# Patient Record
Sex: MALE | Race: WHITE | NOT HISPANIC OR LATINO | Employment: OTHER | URBAN - METROPOLITAN AREA
[De-identification: names, ages, dates, MRNs, and addresses within clinical notes are randomized per-mention and may not be internally consistent; named-entity substitution may affect disease eponyms.]

---

## 2022-02-10 DIAGNOSIS — Z00.6 EXAMINATION FOR NORMAL COMPARISON FOR CLINICAL RESEARCH: Primary | ICD-10-CM

## 2022-02-28 ENCOUNTER — OFFICE VISIT (OUTPATIENT)
Dept: CARDIAC SURGERY | Facility: CLINIC | Age: 67
End: 2022-02-28

## 2022-02-28 VITALS
WEIGHT: 84 LBS | HEIGHT: 64 IN | OXYGEN SATURATION: 96 % | SYSTOLIC BLOOD PRESSURE: 118 MMHG | BODY MASS INDEX: 14.34 KG/M2 | TEMPERATURE: 97.8 F | DIASTOLIC BLOOD PRESSURE: 60 MMHG | HEART RATE: 100 BPM

## 2022-02-28 DIAGNOSIS — R91.8 LUNG NODULES: ICD-10-CM

## 2022-02-28 DIAGNOSIS — I71.40 AAA (ABDOMINAL AORTIC ANEURYSM) WITHOUT RUPTURE: Primary | ICD-10-CM

## 2022-02-28 PROCEDURE — 99204 OFFICE O/P NEW MOD 45 MIN: CPT | Performed by: NURSE PRACTITIONER

## 2022-02-28 RX ORDER — ATORVASTATIN CALCIUM 40 MG/1
TABLET, FILM COATED ORAL
COMMUNITY
Start: 2022-02-07

## 2022-02-28 RX ORDER — ASPIRIN 81 MG/1
81 TABLET ORAL DAILY
COMMUNITY

## 2022-02-28 RX ORDER — NIFEDIPINE 30 MG/1
TABLET, EXTENDED RELEASE ORAL
COMMUNITY
Start: 2022-02-07

## 2022-02-28 RX ORDER — ALBUTEROL SULFATE 90 UG/1
AEROSOL, METERED RESPIRATORY (INHALATION)
COMMUNITY
Start: 2022-02-07

## 2022-02-28 NOTE — PROGRESS NOTES
[facility] Cardiothoracic Surgery New Patient Office Note     Date of Encounter: 2022     Name: Monty Herzog  : 1955     Referred By: No ref. provider found  PCP: Ye Celestin MD    Chief Complaint:    Chief Complaint   Patient presents with   • Consult     NP per Dr. Celestin for AAA.        Subjective      History of Present Illness:    Monty Herzog is a 66 y.o. male current smoker, with a history of HTN, HLD on statin therapy, COPD, mycobacterium kansaii treated at  in , and anemia. Patient presents today with his Son-in-law as new patient to establish with Dr. Mcfaralnd and referred by his PCP Dr. Celestin for evaluation of recently diagnosed AAA. Patient was undergoing workup for nonspecific abdominal pain with US imaging when an incidental finding of AAA was noted.  CT scan was obtained for further evaluation.  Patient reports nonspecific intermittent LLQ abdominal pain that he has had for the last 5 to 6 months that is being followed by his PCP and now with recent referral to GI for further evaluation.  He denies any sharp abdominal pain, back pain, flank pain.  Patient has baseline chronic FRANCIS due to long smoking history and COPD.  He does report family history of aneurysmal disease including his father and a cousin.  He reports his blood pressure is typically well controlled at home running 120s/70s.  He does unfortunately smoke daily.  Patient's family reports history of mycobacterium kansasii which was treated successfully at  around . Patient now follows with a pulmonologist at [facility] in [place], they cannot remember the name of provider today.     Review of Systems:  Review of Systems   Constitutional: Negative for chills, decreased appetite, diaphoresis, fever, malaise/fatigue, night sweats, weight gain and weight loss.   HENT: Negative for hoarse voice.    Eyes: Negative for blurred vision, double vision and visual disturbance.   Cardiovascular: Negative for  chest pain, claudication, dyspnea on exertion, irregular heartbeat, leg swelling, near-syncope, orthopnea, palpitations, paroxysmal nocturnal dyspnea and syncope.   Respiratory: Negative for cough, hemoptysis, shortness of breath, sputum production and wheezing.    Hematologic/Lymphatic: Negative for adenopathy and bleeding problem. Does not bruise/bleed easily.   Skin: Negative for color change, nail changes, poor wound healing and rash.   Musculoskeletal: Negative for back pain, falls and muscle cramps.   Gastrointestinal: Negative for abdominal pain, dysphagia and heartburn.   Genitourinary: Negative for flank pain.   Neurological: Negative for brief paralysis, disturbances in coordination, dizziness, focal weakness, headaches, light-headedness, loss of balance, numbness, paresthesias, sensory change, vertigo and weakness.   Psychiatric/Behavioral: Negative for depression and suicidal ideas.   Allergic/Immunologic: Negative for persistent infections.       I have reviewed the following portions of the patient's history: allergies, current medications, past family history, past medical history, past social history, past surgical history, problem list and ROS and confirm it's accurate.    Allergies:  No Known Allergies    Medications:      Current Outpatient Medications:   •  albuterol sulfate  (90 Base) MCG/ACT inhaler, , Disp: , Rfl:   •  aspirin 81 MG EC tablet, Take 81 mg by mouth Daily., Disp: , Rfl:   •  atorvastatin (LIPITOR) 40 MG tablet, , Disp: , Rfl:   •  NIFEdipine XL (PROCARDIA XL) 30 MG 24 hr tablet, , Disp: , Rfl:   •  Trelegy Ellipta 100-62.5-25 MCG/INH inhaler, , Disp: , Rfl:   •  vitamin D3 125 MCG (5000 UT) capsule capsule, , Disp: , Rfl:     History:   Past Medical History:   Diagnosis Date   • AAA (abdominal aortic aneurysm) (HCC)    • Anemia    • HTN (hypertension)        Past Surgical History:   Procedure Laterality Date   • BRAIN SURGERY     • KNEE SURGERY         Social History  "    Socioeconomic History   • Marital status:    • Number of children: 4   Tobacco Use   • Smoking status: Current Every Day Smoker     Packs/day: 1.00     Years: 50.00     Pack years: 50.00     Types: Cigarettes   • Smokeless tobacco: Former User     Types: Chew   Substance and Sexual Activity   • Alcohol use: Yes     Comment: occasional    • Drug use: Never        Family History   Problem Relation Age of Onset   • Breast cancer Mother    • Hypertension Mother    • COPD Father    • Lung disease Father        Objective     Physical Exam:  Vitals:    02/28/22 1021   BP: 118/60   BP Location: Right arm   Pulse: 100   Temp: 97.8 °F (36.6 °C)   SpO2: 96%   Weight: 38.1 kg (84 lb)   Height: 162.6 cm (64\")      Body mass index is 14.42 kg/m².    Physical Exam  Vitals and nursing note reviewed.   Constitutional:       Appearance: Normal appearance. He is well-developed.   HENT:      Head: Normocephalic and atraumatic.   Eyes:      Pupils: Pupils are equal, round, and reactive to light.   Neck:      Vascular: No carotid bruit.   Cardiovascular:      Rate and Rhythm: Normal rate and regular rhythm.      Pulses: Normal pulses.      Heart sounds: Normal heart sounds, S1 normal and S2 normal. No murmur heard.      Pulmonary:      Effort: Pulmonary effort is normal.      Breath sounds: Examination of the right-middle field reveals decreased breath sounds. Examination of the left-middle field reveals decreased breath sounds. Examination of the right-lower field reveals decreased breath sounds. Examination of the left-lower field reveals decreased breath sounds. Decreased breath sounds present.   Abdominal:      Palpations: Abdomen is soft.   Musculoskeletal:         General: No swelling.      Cervical back: Neck supple.      Right lower leg: No edema.      Left lower leg: No edema.   Skin:     General: Skin is warm and dry.      Capillary Refill: Capillary refill takes less than 2 seconds.      Findings: No bruising. "   Neurological:      General: No focal deficit present.      Mental Status: He is alert and oriented to person, place, and time. Mental status is at baseline.      GCS: GCS eye subscore is 4. GCS verbal subscore is 5. GCS motor subscore is 6.      Motor: Motor function is intact.      Coordination: Coordination is intact.      Gait: Gait is intact.   Psychiatric:         Mood and Affect: Mood normal.         Speech: Speech normal.         Behavior: Behavior normal. Behavior is cooperative.         Cognition and Memory: Cognition normal.         Imaging/Labs:    CT abdomen/pelvis W/WO contrast  Result date 1/26/2022:  Findings: Abdomen: There is severe emphysematous change in the lung bases.  There is a trace pericardial effusion.  There is no pleural effusion.  The heart size is normal.  The liver is unremarkable.  The gallbladder is present with no visible stones.  The spleen, pancreas, and adrenal glands are unremarkable.  The right kidney is unremarkable.  There is a hypodense left renal lesion measuring 18 HEU and 14 mm consistent with a cyst.  The celiac axis and SMA are patent.  The left renal artery originates about the aneurysm.  The right renal artery is not well seen.  There is a fusiform abdominal aortic aneurysm with a maximum AP diameter of 35 mm in maximal transverse diameter of 37 mm.  This extends approximate 11 cm to the bifurcation.  There is a mural thrombus/plaque identified.  There are significant vascular calcifications noted.  The common iliac arteries are normal in caliber bilaterally.  Pelvis: The appendix is normal.  The urinary bladder is almost completely collapsed.  The prostate is normal in size with calcifications.  There is no free fluid.  There is diverticulosis without evidence of diverticulitis.  Impression: Abdominal aortic aneurysm with a maximum AP diameter of 35 mm extending for 11 cm, involvement of the right renal artery is not excluded.  Recommend CTA.  Left renal cyst.   Severe emphysematous change.  Diverticulosis without evidence of diverticulitis.  Personally reviewed, AAA measuring 3.6 cm with intramural thrombus.     US aorta  Result date 1/19/2022:  Findings: There is aneurysmal dilatation of the abdominal aorta starting suprarenal he with a maximum AP diameter of 35 mm and a maximum transverse diameter of 47 mm at the level of the mid abdominal aorta.  There is circumferential mural thrombus/plaque.  Recommend CTA.  The iliac arteries are unremarkable at 9 mm bilaterally.  Impression: Aneurysmal dilatation of the abdominal aorta as described.  Recommend CTA.     CT chest low dose  Result date 10/27/21:   Findings:  Chest: There is no axillary adenopathy.  There is no hilar or mediastinal adenopathy.  The heart is proper size.  There is no pericardial or pleural effusion.  Limited images of the upper abdomen demonstrate incomplete visualization of abdominal aortic aneurysm measuring up to 35 mm with mural calcification; recommend CT abdomen pelvis with IV contrast or CT abdomen and pelvis.  Nonobstructing right renal calcifications noted.  Lung window images demonstrate severe emphysematous change bilaterally.  There are multiple irregular nodules with pleural stranding likely representing scarring.  No priors available for comparison.  These are identified in the right upper and lower lobes, the large area of scarring in the right lower lobe demonstrate some calcification peripherally and inferiorly.  In the left lower lobe laterally there is an oval 36 mm subpleural nodule with a mild rim peripheral calcification, Ab2 is posttraumatic.  Cavitary disease noted in the left apex with a linear stranding and volume loss in the left hemithorax.  No bony destructive lesion identified.  Consider PET/CT or a 3-month follow-up CT study.  Impression: Lung RADS category 4A.  Recommend PET/CT or 3-month follow-up CT to document stability probable scarring in the right upper and bilateral  lower lobes and cavitary disease in the left upper lobe.  Partial visualization of abdominal aortic aneurysm recommend CT or and with contrast for CTA of the abdomen and pelvis.  Ultrasound of the abdominal aorta is another option.    Assessment / Plan      Assessment / Plan:  Diagnoses and all orders for this visit:    1. AAA (abdominal aortic aneurysm) without rupture (HCC) (Primary)    2. Lung nodules       1. 3.6 cm AAA: Patient denies any acute abdominal pain, flank pain, or back pain.  He does endorse family history of aneurysmal disease including his father and a cousin.  His blood pressure is well controlled at home.  Reviewed CT imaging with patient and son-in-law, revealing 3.6 cm AAA with intramural thrombus noted.  I discussed that this is small AAA and does not warrant surgical intervention at this time and that we do not typically perform surgical repair until around 5.0 cm.  I advised that patient continue to keep his blood pressure well controlled and to quit smoking in regards to future aneurysmal growth.  He is not interested in quitting today.  We will plan to see the patient back in 1 year for AAA ultrasound for surveillance of his AAA.  Instructed patient to report to the ER for sharp abdominal pain, flank pain, or back pain.  He should continue further work-up of his LLQ abdominal pain with PCP and GI.  2.  Lung nodules, abnormal CT chest: Patient has chronic SOA s/t long-term cigarette use and COPD.  He denies any hemoptysis, lymphadenopathy, weight loss, fevers, or chills today.  It appears that his PCP performed a low-dose CT chest due to patient's long-term smoking history which he included with his referral information and I reviewed today.  CT imaging with concerning bilateral lung scarring and cavitary disease in the left upper lobe.  Patient does have a history of Mycobacterium kansasii and there are no other CT chests imaging available for my review today.  Patient does currently  follow with pulmonology at [facility] in [place], but patient and son-in-law were unsure of the provider's name that they follow with today.  Recommend close follow-up with patient's pulmonologist regarding abnormal CT chest for further evaluation since he is already established with them.  Will attempt to contact patient's pulmonologist or PCP to obtain name and send my note and low-dose CT chest results for their review.    Patient Education:  Monty Herzog  reports that he has been smoking cigarettes. He has a 50.00 pack-year smoking history. He has quit using smokeless tobacco.  His smokeless tobacco use included chew.. I have educated him on the risk of diseases from using tobacco products such as cancer, COPD and heart disease.     I advised him to quit and he is not willing to quit.    I spent 6 minutes counseling the patient.      Follow Up:   Return in about 1 year (around 2/28/2023) for F/u with imaging.   Or sooner for any further concerns or worsening sign and symptoms. If unable to reach us in the office please dial 911 or go to the nearest emergency department.      [facility]  [facility] Cardiothoracic Surgery    Time Spent: I spent 46 minutes caring for Monty on this date of service. This time includes time spent by me in the following activities: preparing for the visit, reviewing tests, obtaining and/or reviewing a separately obtained history, performing a medically appropriate examination and/or evaluation, counseling and educating the patient/family/caregiver, ordering medications, tests, or procedures, referring and communicating with other health care professionals, documenting information in the medical record, independently interpreting results and communicating that information with the patient/family/caregiver and care coordination.

## 2023-01-20 DIAGNOSIS — I71.40 ABDOMINAL AORTIC ANEURYSM (AAA) WITHOUT RUPTURE, UNSPECIFIED PART: Primary | ICD-10-CM
